# Patient Record
Sex: MALE | URBAN - METROPOLITAN AREA
[De-identification: names, ages, dates, MRNs, and addresses within clinical notes are randomized per-mention and may not be internally consistent; named-entity substitution may affect disease eponyms.]

---

## 2024-11-24 ENCOUNTER — OFFICE VISIT (OUTPATIENT)
Dept: URGENT CARE | Age: 54
End: 2024-11-24
Payer: COMMERCIAL

## 2024-11-24 VITALS
OXYGEN SATURATION: 98 % | RESPIRATION RATE: 18 BRPM | DIASTOLIC BLOOD PRESSURE: 84 MMHG | TEMPERATURE: 97.4 F | HEART RATE: 67 BPM | SYSTOLIC BLOOD PRESSURE: 141 MMHG

## 2024-11-24 DIAGNOSIS — H66.91 RIGHT OTITIS MEDIA, UNSPECIFIED OTITIS MEDIA TYPE: ICD-10-CM

## 2024-11-24 DIAGNOSIS — H61.23 BILATERAL IMPACTED CERUMEN: Primary | ICD-10-CM

## 2024-11-24 PROCEDURE — 99203 OFFICE O/P NEW LOW 30 MIN: CPT | Performed by: NURSE PRACTITIONER

## 2024-11-24 RX ORDER — DOXYCYCLINE 100 MG/1
100 CAPSULE ORAL 2 TIMES DAILY
Qty: 14 CAPSULE | Refills: 0 | Status: SHIPPED | OUTPATIENT
Start: 2024-11-24 | End: 2024-12-01

## 2024-11-24 NOTE — PATIENT INSTRUCTIONS
Doxy for 7 days  OTC flonase and zyrtec for duration of ABT  ENT referral. If no improvement, please call  If worsening, please go to ER    Please follow up with your primary provider within one week if symptoms do not improve.  You may schedule an appointment online at [email or web address] or call [phone]. Go to the Emergency Department if symptoms significantly worsen or if you develop chest pain or shortness of breath.

## 2024-11-24 NOTE — PROGRESS NOTES
Subjective   Patient ID: Stephane Delong is a 54 y.o. male. They present today with a chief complaint of Ear Problem (4 days x ears are blocked ).    History of Present Illness  Stephane Delong is a 54 y.o. male who presents to the clinic for ear fullness right for 4 days, left for 2 days. Pt has tried OTC debrox with little relief.  Pt denies any chest pain, sob, N/V at this time in clinic.             Past Medical History  Allergies as of 11/24/2024 - Reviewed 11/24/2024   Allergen Reaction Noted    Penicillins Unknown 11/24/2024       (Not in a hospital admission)       No past medical history on file.    No past surgical history on file.         Review of Systems  Review of Systems   HENT:  Positive for ear pain.         Ear fullness bilateral   All other systems reviewed and are negative.                                 Objective    Vitals:    11/24/24 0909   BP: 141/84   Pulse: 67   Resp: 18   Temp: 36.3 °C (97.4 °F)   SpO2: 98%     No LMP for male patient.    Physical Exam  Constitutional:       Appearance: Normal appearance.   HENT:      Right Ear: There is impacted cerumen.      Left Ear: There is impacted cerumen.   Neurological:      General: No focal deficit present.      Mental Status: He is alert and oriented to person, place, and time. Mental status is at baseline.   Psychiatric:         Mood and Affect: Mood normal.         Behavior: Behavior normal.         Procedures  Post cerumen removal  Right erythema noted, TM visualized  Left: TM visualized, no erythema noted  -cerumen removal performed by Aldo Richards MA. Water lavage used.    Point of Care Test & Imaging Results from this visit  No results found for this visit on 11/24/24.   No results found.    Diagnostic study results (if any) were reviewed by Brian Garza, APRN-CNP.    Assessment/Plan   Allergies, medications, history, and pertinent labs/EKGs/Imaging reviewed by RADHA Jeffries-CNP.     Medical Decision Making   History and  examination consistent with acute uncomplicated Otitis media. No evidence of TM perforation, otitis externa, mastoiditis, or sepsis. Counseled patient/family on treatment plan with oral antibiotics and supportive measures at home. Return to clinic or present to ED if symptoms change or worsen. Otherwise follow-up with PCP. Patient verbalized understanding and agrees with plan.   -if no improvement, pt is advised to call ENT (referral placed)    Orders and Diagnoses  Diagnoses and all orders for this visit:  Bilateral impacted cerumen  -     Ear cerumen removal; Future      Medical Admin Record      Patient disposition: Home    Electronically signed by Brian Garza, APRN-CNP  9:27 AM